# Patient Record
Sex: MALE | Race: WHITE | NOT HISPANIC OR LATINO | URBAN - METROPOLITAN AREA
[De-identification: names, ages, dates, MRNs, and addresses within clinical notes are randomized per-mention and may not be internally consistent; named-entity substitution may affect disease eponyms.]

---

## 2020-04-03 ENCOUNTER — INPATIENT (INPATIENT)
Facility: HOSPITAL | Age: 65
LOS: 6 days | Discharge: NURSING, REHAB OR LONG-TERM CARE | End: 2020-04-10
Attending: HOSPITALIST | Admitting: HOSPITALIST
Payer: SELF-PAY

## 2020-04-03 VITALS
TEMPERATURE: 98 F | SYSTOLIC BLOOD PRESSURE: 147 MMHG | DIASTOLIC BLOOD PRESSURE: 94 MMHG | RESPIRATION RATE: 18 BRPM | OXYGEN SATURATION: 97 % | HEART RATE: 100 BPM

## 2020-04-03 LAB
ALBUMIN SERPL ELPH-MCNC: 4 G/DL — SIGNIFICANT CHANGE UP (ref 3.3–5)
ALP SERPL-CCNC: 82 U/L — SIGNIFICANT CHANGE UP (ref 40–120)
ALT FLD-CCNC: 95 U/L — HIGH (ref 4–41)
AMMONIA BLD-MCNC: 16 UMOL/L — SIGNIFICANT CHANGE UP (ref 11–55)
ANION GAP SERPL CALC-SCNC: 19 MMO/L — HIGH (ref 7–14)
APAP SERPL-MCNC: < 15 UG/ML — LOW (ref 15–25)
APTT BLD: 26.3 SEC — LOW (ref 27.5–36.3)
AST SERPL-CCNC: 170 U/L — HIGH (ref 4–40)
BASE EXCESS BLDV CALC-SCNC: 0.3 MMOL/L — SIGNIFICANT CHANGE UP
BASOPHILS # BLD AUTO: 0.01 K/UL — SIGNIFICANT CHANGE UP (ref 0–0.2)
BASOPHILS NFR BLD AUTO: 0.1 % — SIGNIFICANT CHANGE UP (ref 0–2)
BILIRUB SERPL-MCNC: 0.7 MG/DL — SIGNIFICANT CHANGE UP (ref 0.2–1.2)
BLOOD GAS VENOUS - CREATININE: 1.59 MG/DL — HIGH (ref 0.5–1.3)
BLOOD GAS VENOUS - FIO2: 21 — SIGNIFICANT CHANGE UP
BUN SERPL-MCNC: 65 MG/DL — HIGH (ref 7–23)
CALCIUM SERPL-MCNC: 9.7 MG/DL — SIGNIFICANT CHANGE UP (ref 8.4–10.5)
CHLORIDE BLDV-SCNC: 109 MMOL/L — HIGH (ref 96–108)
CHLORIDE SERPL-SCNC: 99 MMOL/L — SIGNIFICANT CHANGE UP (ref 98–107)
CO2 SERPL-SCNC: 20 MMOL/L — LOW (ref 22–31)
CREAT SERPL-MCNC: 1.5 MG/DL — HIGH (ref 0.5–1.3)
EOSINOPHIL # BLD AUTO: 0.01 K/UL — SIGNIFICANT CHANGE UP (ref 0–0.5)
EOSINOPHIL NFR BLD AUTO: 0.1 % — SIGNIFICANT CHANGE UP (ref 0–6)
ETHANOL BLD-MCNC: < 10 MG/DL — SIGNIFICANT CHANGE UP
GAS PNL BLDV: 142 MMOL/L — SIGNIFICANT CHANGE UP (ref 136–146)
GLUCOSE BLDV-MCNC: 109 MG/DL — HIGH (ref 70–99)
GLUCOSE SERPL-MCNC: 111 MG/DL — HIGH (ref 70–99)
HCO3 BLDV-SCNC: 23 MMOL/L — SIGNIFICANT CHANGE UP (ref 20–27)
HCT VFR BLD CALC: 34 % — LOW (ref 39–50)
HCT VFR BLDV CALC: 35.8 % — LOW (ref 39–51)
HGB BLD-MCNC: 11.2 G/DL — LOW (ref 13–17)
HGB BLDV-MCNC: 11.6 G/DL — LOW (ref 13–17)
IMM GRANULOCYTES NFR BLD AUTO: 0.6 % — SIGNIFICANT CHANGE UP (ref 0–1.5)
INR BLD: 1.09 — SIGNIFICANT CHANGE UP (ref 0.88–1.17)
LACTATE BLDV-MCNC: 1.6 MMOL/L — SIGNIFICANT CHANGE UP (ref 0.5–2)
LYMPHOCYTES # BLD AUTO: 0.61 K/UL — LOW (ref 1–3.3)
LYMPHOCYTES # BLD AUTO: 6 % — LOW (ref 13–44)
MAGNESIUM SERPL-MCNC: 2.9 MG/DL — HIGH (ref 1.6–2.6)
MCHC RBC-ENTMCNC: 29.6 PG — SIGNIFICANT CHANGE UP (ref 27–34)
MCHC RBC-ENTMCNC: 32.9 % — SIGNIFICANT CHANGE UP (ref 32–36)
MCV RBC AUTO: 89.7 FL — SIGNIFICANT CHANGE UP (ref 80–100)
MONOCYTES # BLD AUTO: 1.12 K/UL — HIGH (ref 0–0.9)
MONOCYTES NFR BLD AUTO: 11 % — SIGNIFICANT CHANGE UP (ref 2–14)
NEUTROPHILS # BLD AUTO: 8.36 K/UL — HIGH (ref 1.8–7.4)
NEUTROPHILS NFR BLD AUTO: 82.2 % — HIGH (ref 43–77)
NRBC # FLD: 0 K/UL — SIGNIFICANT CHANGE UP (ref 0–0)
PCO2 BLDV: 43 MMHG — SIGNIFICANT CHANGE UP (ref 41–51)
PH BLDV: 7.38 PH — SIGNIFICANT CHANGE UP (ref 7.32–7.43)
PHOSPHATE SERPL-MCNC: 3.9 MG/DL — SIGNIFICANT CHANGE UP (ref 2.5–4.5)
PLATELET # BLD AUTO: 266 K/UL — SIGNIFICANT CHANGE UP (ref 150–400)
PMV BLD: 10.5 FL — SIGNIFICANT CHANGE UP (ref 7–13)
PO2 BLDV: < 24 MMHG — LOW (ref 35–40)
POTASSIUM BLDV-SCNC: 4.4 MMOL/L — SIGNIFICANT CHANGE UP (ref 3.4–4.5)
POTASSIUM SERPL-MCNC: 4.4 MMOL/L — SIGNIFICANT CHANGE UP (ref 3.5–5.3)
POTASSIUM SERPL-SCNC: 4.4 MMOL/L — SIGNIFICANT CHANGE UP (ref 3.5–5.3)
PROT SERPL-MCNC: 8.3 G/DL — SIGNIFICANT CHANGE UP (ref 6–8.3)
PROTHROM AB SERPL-ACNC: 12.6 SEC — SIGNIFICANT CHANGE UP (ref 9.8–13.1)
RBC # BLD: 3.79 M/UL — LOW (ref 4.2–5.8)
RBC # FLD: 13.8 % — SIGNIFICANT CHANGE UP (ref 10.3–14.5)
SALICYLATES SERPL-MCNC: < 5 MG/DL — LOW (ref 15–30)
SAO2 % BLDV: 23 % — LOW (ref 60–85)
SODIUM SERPL-SCNC: 138 MMOL/L — SIGNIFICANT CHANGE UP (ref 135–145)
WBC # BLD: 10.17 K/UL — SIGNIFICANT CHANGE UP (ref 3.8–10.5)
WBC # FLD AUTO: 10.17 K/UL — SIGNIFICANT CHANGE UP (ref 3.8–10.5)

## 2020-04-03 PROCEDURE — 70450 CT HEAD/BRAIN W/O DYE: CPT | Mod: 26

## 2020-04-03 PROCEDURE — 71045 X-RAY EXAM CHEST 1 VIEW: CPT | Mod: 26

## 2020-04-03 RX ORDER — CEFTRIAXONE 500 MG/1
1000 INJECTION, POWDER, FOR SOLUTION INTRAMUSCULAR; INTRAVENOUS ONCE
Refills: 0 | Status: COMPLETED | OUTPATIENT
Start: 2020-04-03 | End: 2020-04-03

## 2020-04-03 RX ORDER — AZITHROMYCIN 500 MG/1
500 TABLET, FILM COATED ORAL ONCE
Refills: 0 | Status: COMPLETED | OUTPATIENT
Start: 2020-04-03 | End: 2020-04-03

## 2020-04-03 RX ORDER — ACETAMINOPHEN 500 MG
650 TABLET ORAL ONCE
Refills: 0 | Status: COMPLETED | OUTPATIENT
Start: 2020-04-03 | End: 2020-04-03

## 2020-04-03 RX ORDER — SODIUM CHLORIDE 9 MG/ML
500 INJECTION INTRAMUSCULAR; INTRAVENOUS; SUBCUTANEOUS ONCE
Refills: 0 | Status: COMPLETED | OUTPATIENT
Start: 2020-04-03 | End: 2020-04-03

## 2020-04-03 RX ADMIN — SODIUM CHLORIDE 500 MILLILITER(S): 9 INJECTION INTRAMUSCULAR; INTRAVENOUS; SUBCUTANEOUS at 22:54

## 2020-04-03 RX ADMIN — CEFTRIAXONE 100 MILLIGRAM(S): 500 INJECTION, POWDER, FOR SOLUTION INTRAMUSCULAR; INTRAVENOUS at 22:54

## 2020-04-03 NOTE — ED PROVIDER NOTE - ATTENDING CONTRIBUTION TO CARE
DR. WHITE, ATTENDING MD-  I performed a face to face bedside interview with the patient regarding history of present illness, review of symptoms and past medical history. I completed an independent physical exam.  I have discussed the patient's plan of care with the resident.   Documentation as above in the note.    70 y/o male bib ems after found on ground in a bank lobby.  Noted to be confused.  Pt febrile here in ED.  Infectious or metabolic encephalopathy vs lyte abn vs ich.  Unclear baseline ms.  Obtain cbc, cmp, ct head, cxr, covid19, give antipyretic, admit for further care and evaluation.

## 2020-04-03 NOTE — ED PROVIDER NOTE - PHYSICAL EXAMINATION
Gen: AAOx1, only says yes, appears in NAD  Head: NCAT  ENT: Airway patent, dry mucous membranes, nasal passageways clear, b/l PERRLA   Cardiac: Normal rate, normal rhythm, no murmurs   Respiratory: Lungs CTA B/L  Gastrointestinal:  , Abdomen soft, nontender, nondistended, no rebound, no guarding   MSK: No gross abnormalities, FROM of all four extremities   HEME: Extremities warm, pulses intact and symmetrical in all four extremities  Skin: + scattered healing scratch marks over arms and legs   Neuro: No gross neurologic deficits, no facial asymmetry, slight intention tremor, unable to comply with exam but sits forward to use bathroom.

## 2020-04-03 NOTE — ED PROVIDER NOTE - OBJECTIVE STATEMENT
69M unclear history, as ot unable to offer any information, found by EMS seated on the ground inside of [place], unclear what had transpired prior, he was not aggressive but had an unsteady gait and reportedly did not appear intoxicated. Otherwise no information prior to being found.

## 2020-04-03 NOTE — ED ADULT TRIAGE NOTE - CHIEF COMPLAINT QUOTE
Pt brought in by EMS from [facility] for AMS, pt appears lethargic. Pt was found sitting in the bank.

## 2020-04-03 NOTE — ED PROVIDER NOTE - CLINICAL SUMMARY MEDICAL DECISION MAKING FREE TEXT BOX
69M found outside, unclear historian, no gross deficits, does not appear intoxicated, suspect dementia? will check labs, ekg, cxr, cth and reassess

## 2020-04-03 NOTE — ED PROVIDER NOTE - PROGRESS NOTE DETAILS
Elizabeth Goldberger PGY-3: pt signed out to me pending results. CTH w/o acute pathology. CXR w/ possible pna. Labs unremarkable other than possible VEGA 1.5 though no prior value. Will admit for pna, AMS and covid r/o

## 2020-04-04 DIAGNOSIS — R41.82 ALTERED MENTAL STATUS, UNSPECIFIED: ICD-10-CM

## 2020-04-04 DIAGNOSIS — Z29.9 ENCOUNTER FOR PROPHYLACTIC MEASURES, UNSPECIFIED: ICD-10-CM

## 2020-04-04 DIAGNOSIS — R74.0 NONSPECIFIC ELEVATION OF LEVELS OF TRANSAMINASE AND LACTIC ACID DEHYDROGENASE [LDH]: ICD-10-CM

## 2020-04-04 DIAGNOSIS — E87.2 ACIDOSIS: ICD-10-CM

## 2020-04-04 DIAGNOSIS — G93.40 ENCEPHALOPATHY, UNSPECIFIED: ICD-10-CM

## 2020-04-04 DIAGNOSIS — R50.9 FEVER, UNSPECIFIED: ICD-10-CM

## 2020-04-04 DIAGNOSIS — N17.9 ACUTE KIDNEY FAILURE, UNSPECIFIED: ICD-10-CM

## 2020-04-04 DIAGNOSIS — R68.89 OTHER GENERAL SYMPTOMS AND SIGNS: ICD-10-CM

## 2020-04-04 DIAGNOSIS — Z78.9 OTHER SPECIFIED HEALTH STATUS: Chronic | ICD-10-CM

## 2020-04-04 DIAGNOSIS — D64.9 ANEMIA, UNSPECIFIED: ICD-10-CM

## 2020-04-04 LAB
AMPHET UR-MCNC: NEGATIVE — SIGNIFICANT CHANGE UP
APPEARANCE UR: CLEAR — SIGNIFICANT CHANGE UP
BACTERIA # UR AUTO: NEGATIVE — SIGNIFICANT CHANGE UP
BARBITURATES UR SCN-MCNC: NEGATIVE — SIGNIFICANT CHANGE UP
BENZODIAZ UR-MCNC: NEGATIVE — SIGNIFICANT CHANGE UP
BILIRUB UR-MCNC: NEGATIVE — SIGNIFICANT CHANGE UP
BLOOD UR QL VISUAL: HIGH
CANNABINOIDS UR-MCNC: NEGATIVE — SIGNIFICANT CHANGE UP
COCAINE METAB.OTHER UR-MCNC: NEGATIVE — SIGNIFICANT CHANGE UP
COLOR SPEC: SIGNIFICANT CHANGE UP
GLUCOSE UR-MCNC: NEGATIVE — SIGNIFICANT CHANGE UP
HYALINE CASTS # UR AUTO: NEGATIVE — SIGNIFICANT CHANGE UP
KETONES UR-MCNC: SIGNIFICANT CHANGE UP
LEUKOCYTE ESTERASE UR-ACNC: NEGATIVE — SIGNIFICANT CHANGE UP
METHADONE UR-MCNC: NEGATIVE — SIGNIFICANT CHANGE UP
NITRITE UR-MCNC: NEGATIVE — SIGNIFICANT CHANGE UP
OPIATES UR-MCNC: NEGATIVE — SIGNIFICANT CHANGE UP
OXYCODONE UR-MCNC: NEGATIVE — SIGNIFICANT CHANGE UP
PCP UR-MCNC: NEGATIVE — SIGNIFICANT CHANGE UP
PH UR: 5.5 — SIGNIFICANT CHANGE UP (ref 5–8)
PROT UR-MCNC: 30 — SIGNIFICANT CHANGE UP
RBC CASTS # UR COMP ASSIST: HIGH (ref 0–?)
SARS-COV-2 RNA SPEC QL NAA+PROBE: DETECTED
SP GR SPEC: 1.02 — SIGNIFICANT CHANGE UP (ref 1–1.04)
SQUAMOUS # UR AUTO: SIGNIFICANT CHANGE UP
UROBILINOGEN FLD QL: NORMAL — SIGNIFICANT CHANGE UP
WBC UR QL: SIGNIFICANT CHANGE UP (ref 0–?)

## 2020-04-04 RX ORDER — SODIUM CHLORIDE 9 MG/ML
1000 INJECTION, SOLUTION INTRAVENOUS
Refills: 0 | Status: COMPLETED | OUTPATIENT
Start: 2020-04-04 | End: 2020-04-04

## 2020-04-04 RX ADMIN — SODIUM CHLORIDE 75 MILLILITER(S): 9 INJECTION, SOLUTION INTRAVENOUS at 08:08

## 2020-04-04 NOTE — H&P ADULT - HISTORY OF PRESENT ILLNESS
69 year old man found at [facility] [employer]. 69 year old man found at [facility]. Per the ED note, the patient was sitting on the ground, not aggressive, and had an unsteady gait. The patient is not able to provide any additional history.     In the ED: Tmax 100.5, , /94, R 18, SpO2 97%RA. He was given ceftriaxone, azithromycin, Tylenol, and 500cc of NS.

## 2020-04-04 NOTE — H&P ADULT - PROBLEM SELECTOR PLAN 8
F: No IVF for now  E: Replete electrolytes for a K of 4 and Mg of 2  N: NPO for now  P: SCDs  C: Full Code  D: Admit to medicine

## 2020-04-04 NOTE — H&P ADULT - NSHPLABSRESULTS_GEN_ALL_CORE
.  LABS:                         11.2   10.17 )-----------( 266      ( 03 Apr 2020 22:20 )             34.0     04-03    138  |  99  |  65<H>  ----------------------------<  111<H>  4.4   |  20<L>  |  1.50<H>    Ca    9.7      03 Apr 2020 22:20  Phos  3.9     04-03  Mg     2.9     04-03    TPro  8.3  /  Alb  4.0  /  TBili  0.7  /  DBili  x   /  AST  170<H>  /  ALT  95<H>  /  AlkPhos  82  04-03    PT/INR - ( 03 Apr 2020 22:20 )   PT: 12.6 SEC;   INR: 1.09          PTT - ( 03 Apr 2020 22:20 )  PTT:26.3 SEC              RADIOLOGY, EKG & ADDITIONAL TESTS: Reviewed. LABS:                         11.2   10.17 )-----------( 266      ( 03 Apr 2020 22:20 )             34.0     04-03    138  |  99  |  65<H>  ----------------------------<  111<H>  4.4   |  20<L>  |  1.50<H>    Ca    9.7      03 Apr 2020 22:20  Phos  3.9     04-03  Mg     2.9     04-03    TPro  8.3  /  Alb  4.0  /  TBili  0.7  /  DBili  x   /  AST  170<H>  /  ALT  95<H>  /  AlkPhos  82  04-03    PT/INR - ( 03 Apr 2020 22:20 )   PT: 12.6 SEC;   INR: 1.09          PTT - ( 03 Apr 2020 22:20 )  PTT:26.3 SEC    Toxicology Screen, Drugs of Abuse, Urine (04.04.20 @ 00:15)    Phencyclidine Level, Urine: NEGATIVE    Amphetamine, Urine: NEGATIVE    Barbiturates Screen, Urine: NEGATIVE    Benzodiazepine, Urine: NEGATIVE    Cannabinoids, Urine: NEGATIVE    Cocaine Metabolite, Urine: NEGATIVE    Methadone, Urine: NEGATIVE    Opiate, Urine: NEGATIVE    Oxycodone, Urine: NEGATIVE    Toxicology Screen, Drugs of Abuse, Serum (04.03.20 @ 22:20)    Ethanol, Whole Blood: < 10:     Acetaminophen Level, Serum: < 15.0    Salicylate Level, Serum: < 5.0    RADIOLOGY, EKG & ADDITIONAL TESTS:     < from: Xray Chest 1 View AP/PA (04.03.20 @ 21:31) >    INTERPRETATION:  Right lowerlung linear opacity likely atelectasis. Infection cannot be completely excluded.    < end of copied text >    < from: CT Head No Cont (04.03.20 @ 21:05) >    IMPRESSION:     No acute intracranial hemorrhage or mass effect, given the extent of artifact. If clinically indicated, short-term follow-up or MRI may be obtained for further evaluation.     < end of copied text >

## 2020-04-04 NOTE — H&P ADULT - ASSESSMENT
69 year old man found down at [employer]. Febrile, altered mental status. Possible COVID, no other obvious infectious causes. Also with an anion gap metabolic acidosis.

## 2020-04-04 NOTE — PROGRESS NOTE ADULT - PROBLEM SELECTOR PLAN 1
Unclear etiology.   May be secondary to COVID-19  No other clear source  CT head unremarkable  Tox screen negative  labs do not explain MS

## 2020-04-04 NOTE — PROGRESS NOTE ADULT - PROBLEM SELECTOR PLAN 2
COVID +  Minimal O2 requirements at the present  Unclear if there is a benefit for encephalopathy in these patients - given aspiration risk, will hold off plaquenil at present

## 2020-04-04 NOTE — H&P ADULT - PROBLEM SELECTOR PLAN 3
Unclear etiology. Lactate is normal. Possibly 2/2 BUN. Doubt starvation ketoacidosis since only small ketones on Ua. Salicylates and ethanol negative.   - Check serum osm  - Trend BMP

## 2020-04-04 NOTE — H&P ADULT - PROBLEM SELECTOR PLAN 1
Unclear etiology. Possible causes in this patient include: COVID, uremia, medication or drug ingestion, drug or alcohol withdrawal, dementia. No major electrolyte derangements. Ammonia in normal range.  Glucose normal. CT head with no acute findings.   - Check serum osm, B12, Folate, TSH, syphilis screen   - Follow up blood cultures  - Follow up COVID swab   - Social work consult  - HIV screen when patient is able to consent  - Will give banana bag x1

## 2020-04-04 NOTE — H&P ADULT - PROBLEM SELECTOR PLAN 4
Possible COVID. No other obvious infectious sources at this time.   - Monitor off antibiotics for now  - Follow up blood cultures, COVID swab

## 2020-04-04 NOTE — PROGRESS NOTE ADULT - ASSESSMENT
69 year old man found down at [place]. Febrile, altered mental status. Possible COVID, no other obvious infectious causes. Also with an anion gap metabolic acidosis.

## 2020-04-04 NOTE — H&P ADULT - NSHPPHYSICALEXAM_GEN_ALL_CORE
VITAL SIGNS:  T(C): 38.1 (04-03-20 @ 21:47), Max: 38.1 (04-03-20 @ 21:47)  T(F): 100.5 (04-03-20 @ 21:47), Max: 100.5 (04-03-20 @ 21:47)  HR: 100 (04-03-20 @ 18:14) (100 - 100)  BP: 147/94 (04-03-20 @ 18:14) (147/94 - 147/94)  BP(mean): --  RR: 18 (04-03-20 @ 18:14) (18 - 18)  SpO2: 97% (04-03-20 @ 18:14) (97% - 97%)  Wt(kg): --    PHYSICAL EXAM:    Constitutional: Unkempt elderly man sleeping comfortably on stretcher   Head: NC/AT, temporal wasting   Eyes: clear conjunctiva  ENT: MMM, poor dental hygiene   Neck: supple; no JVD, seborrheic dermatitis on face and ears  Respiratory: CTA B/L;   Cardiac: +S1/S2; RRR; no M/R/G  Gastrointestinal: soft, NT/ND; no rebound or guarding;   Genitourinary: no bailon  Extremities: WWP, no clubbing or cyanosis; no peripheral edema  Musculoskeletal: NROM x4; no joint swelling, tenderness or erythema  Dermatologic: excoriations all over extremities, bilateral distal extremities with mild blanching erythema, no induration  Lymphatic: no submandibular or cervical LAD  Neurologic: awakens to noxious stimuli, does not answer questions appropriately   Psychiatric: unable to assess

## 2020-04-05 DIAGNOSIS — E86.0 DEHYDRATION: ICD-10-CM

## 2020-04-05 DIAGNOSIS — U07.1 COVID-19: ICD-10-CM

## 2020-04-05 LAB
ALBUMIN SERPL ELPH-MCNC: 3.4 G/DL — SIGNIFICANT CHANGE UP (ref 3.3–5)
ALP SERPL-CCNC: 73 U/L — SIGNIFICANT CHANGE UP (ref 40–120)
ALT FLD-CCNC: 80 U/L — HIGH (ref 4–41)
ANION GAP SERPL CALC-SCNC: 18 MMO/L — HIGH (ref 7–14)
AST SERPL-CCNC: 98 U/L — HIGH (ref 4–40)
BASE EXCESS BLDV CALC-SCNC: 0.6 MMOL/L — SIGNIFICANT CHANGE UP
BASOPHILS # BLD AUTO: 0.01 K/UL — SIGNIFICANT CHANGE UP (ref 0–0.2)
BASOPHILS NFR BLD AUTO: 0.1 % — SIGNIFICANT CHANGE UP (ref 0–2)
BILIRUB SERPL-MCNC: 0.6 MG/DL — SIGNIFICANT CHANGE UP (ref 0.2–1.2)
BLOOD GAS VENOUS - CREATININE: 0.99 MG/DL — SIGNIFICANT CHANGE UP (ref 0.5–1.3)
BLOOD GAS VENOUS - FIO2: 21 — SIGNIFICANT CHANGE UP
BUN SERPL-MCNC: 29 MG/DL — HIGH (ref 7–23)
CALCIUM SERPL-MCNC: 9.4 MG/DL — SIGNIFICANT CHANGE UP (ref 8.4–10.5)
CHLORIDE BLDV-SCNC: 115 MMOL/L — HIGH (ref 96–108)
CHLORIDE SERPL-SCNC: 110 MMOL/L — HIGH (ref 98–107)
CO2 SERPL-SCNC: 20 MMOL/L — LOW (ref 22–31)
CREAT SERPL-MCNC: 1.03 MG/DL — SIGNIFICANT CHANGE UP (ref 0.5–1.3)
CRP SERPL HS-MCNC: 214.63 MG/L — SIGNIFICANT CHANGE UP
EOSINOPHIL # BLD AUTO: 0.02 K/UL — SIGNIFICANT CHANGE UP (ref 0–0.5)
EOSINOPHIL NFR BLD AUTO: 0.3 % — SIGNIFICANT CHANGE UP (ref 0–6)
FERRITIN SERPL-MCNC: 386.6 NG/ML — SIGNIFICANT CHANGE UP (ref 30–400)
FOLATE SERPL-MCNC: 13 NG/ML — SIGNIFICANT CHANGE UP (ref 4.7–20)
GAS PNL BLDV: 147 MMOL/L — HIGH (ref 136–146)
GLUCOSE BLDV-MCNC: 108 MG/DL — HIGH (ref 70–99)
GLUCOSE SERPL-MCNC: 105 MG/DL — HIGH (ref 70–99)
HCO3 BLDV-SCNC: 25 MMOL/L — SIGNIFICANT CHANGE UP (ref 20–27)
HCT VFR BLD CALC: 30.3 % — LOW (ref 39–50)
HCT VFR BLDV CALC: 32.2 % — LOW (ref 39–51)
HGB BLD-MCNC: 10.2 G/DL — LOW (ref 13–17)
HGB BLDV-MCNC: 10.4 G/DL — LOW (ref 13–17)
IMM GRANULOCYTES NFR BLD AUTO: 0.5 % — SIGNIFICANT CHANGE UP (ref 0–1.5)
IRON SATN MFR SERPL: 178 UG/DL — SIGNIFICANT CHANGE UP (ref 155–535)
IRON SATN MFR SERPL: 19 UG/DL — LOW (ref 45–165)
LACTATE BLDV-MCNC: 2.5 MMOL/L — HIGH (ref 0.5–2)
LYMPHOCYTES # BLD AUTO: 0.43 K/UL — LOW (ref 1–3.3)
LYMPHOCYTES # BLD AUTO: 5.9 % — LOW (ref 13–44)
MAGNESIUM SERPL-MCNC: 2.4 MG/DL — SIGNIFICANT CHANGE UP (ref 1.6–2.6)
MCHC RBC-ENTMCNC: 31.1 PG — SIGNIFICANT CHANGE UP (ref 27–34)
MCHC RBC-ENTMCNC: 33.7 % — SIGNIFICANT CHANGE UP (ref 32–36)
MCV RBC AUTO: 92.4 FL — SIGNIFICANT CHANGE UP (ref 80–100)
MONOCYTES # BLD AUTO: 0.87 K/UL — SIGNIFICANT CHANGE UP (ref 0–0.9)
MONOCYTES NFR BLD AUTO: 11.9 % — SIGNIFICANT CHANGE UP (ref 2–14)
NEUTROPHILS # BLD AUTO: 5.92 K/UL — SIGNIFICANT CHANGE UP (ref 1.8–7.4)
NEUTROPHILS NFR BLD AUTO: 81.3 % — HIGH (ref 43–77)
NRBC # FLD: 0 K/UL — SIGNIFICANT CHANGE UP (ref 0–0)
OSMOLALITY SERPL: 311 MOSMO/KG — HIGH (ref 275–295)
PCO2 BLDV: 43 MMHG — SIGNIFICANT CHANGE UP (ref 41–51)
PH BLDV: 7.39 PH — SIGNIFICANT CHANGE UP (ref 7.32–7.43)
PHOSPHATE SERPL-MCNC: 2.9 MG/DL — SIGNIFICANT CHANGE UP (ref 2.5–4.5)
PLATELET # BLD AUTO: 242 K/UL — SIGNIFICANT CHANGE UP (ref 150–400)
PMV BLD: 11.1 FL — SIGNIFICANT CHANGE UP (ref 7–13)
PO2 BLDV: 48 MMHG — HIGH (ref 35–40)
POTASSIUM BLDV-SCNC: 4 MMOL/L — SIGNIFICANT CHANGE UP (ref 3.4–4.5)
POTASSIUM SERPL-MCNC: 4.3 MMOL/L — SIGNIFICANT CHANGE UP (ref 3.5–5.3)
POTASSIUM SERPL-SCNC: 4.3 MMOL/L — SIGNIFICANT CHANGE UP (ref 3.5–5.3)
PROT SERPL-MCNC: 7.4 G/DL — SIGNIFICANT CHANGE UP (ref 6–8.3)
RBC # BLD: 3.28 M/UL — LOW (ref 4.2–5.8)
RBC # FLD: 14.2 % — SIGNIFICANT CHANGE UP (ref 10.3–14.5)
SAO2 % BLDV: 76.5 % — SIGNIFICANT CHANGE UP (ref 60–85)
SODIUM SERPL-SCNC: 148 MMOL/L — HIGH (ref 135–145)
TSH SERPL-MCNC: 0.19 UIU/ML — LOW (ref 0.27–4.2)
UIBC SERPL-MCNC: 158.9 UG/DL — SIGNIFICANT CHANGE UP (ref 110–370)
VIT B12 SERPL-MCNC: 638 PG/ML — SIGNIFICANT CHANGE UP (ref 200–900)
WBC # BLD: 7.29 K/UL — SIGNIFICANT CHANGE UP (ref 3.8–10.5)
WBC # FLD AUTO: 7.29 K/UL — SIGNIFICANT CHANGE UP (ref 3.8–10.5)

## 2020-04-05 PROCEDURE — 99233 SBSQ HOSP IP/OBS HIGH 50: CPT

## 2020-04-05 RX ORDER — THIAMINE MONONITRATE (VIT B1) 100 MG
100 TABLET ORAL DAILY
Refills: 0 | Status: DISCONTINUED | OUTPATIENT
Start: 2020-04-05 | End: 2020-04-06

## 2020-04-05 RX ORDER — SODIUM CHLORIDE 9 MG/ML
450 INJECTION, SOLUTION INTRAVENOUS
Refills: 0 | Status: DISCONTINUED | OUTPATIENT
Start: 2020-04-05 | End: 2020-04-06

## 2020-04-05 RX ORDER — HYDROXYCHLOROQUINE SULFATE 200 MG
400 TABLET ORAL EVERY 12 HOURS
Refills: 0 | Status: COMPLETED | OUTPATIENT
Start: 2020-04-05 | End: 2020-04-06

## 2020-04-05 RX ORDER — ENOXAPARIN SODIUM 100 MG/ML
40 INJECTION SUBCUTANEOUS DAILY
Refills: 0 | Status: DISCONTINUED | OUTPATIENT
Start: 2020-04-05 | End: 2020-04-10

## 2020-04-05 RX ORDER — HYDROXYCHLOROQUINE SULFATE 200 MG
200 TABLET ORAL EVERY 12 HOURS
Refills: 0 | Status: DISCONTINUED | OUTPATIENT
Start: 2020-04-06 | End: 2020-04-06

## 2020-04-05 RX ORDER — ASCORBIC ACID 60 MG
500 TABLET,CHEWABLE ORAL
Refills: 0 | Status: DISCONTINUED | OUTPATIENT
Start: 2020-04-05 | End: 2020-04-06

## 2020-04-05 RX ORDER — SODIUM CHLORIDE 9 MG/ML
1000 INJECTION INTRAMUSCULAR; INTRAVENOUS; SUBCUTANEOUS
Refills: 0 | Status: DISCONTINUED | OUTPATIENT
Start: 2020-04-05 | End: 2020-04-05

## 2020-04-05 RX ORDER — HYDROXYCHLOROQUINE SULFATE 200 MG
TABLET ORAL
Refills: 0 | Status: DISCONTINUED | OUTPATIENT
Start: 2020-04-05 | End: 2020-04-06

## 2020-04-05 RX ADMIN — ENOXAPARIN SODIUM 40 MILLIGRAM(S): 100 INJECTION SUBCUTANEOUS at 16:05

## 2020-04-05 RX ADMIN — Medication 400 MILLIGRAM(S): at 16:05

## 2020-04-05 RX ADMIN — SODIUM CHLORIDE 75 MILLILITER(S): 9 INJECTION, SOLUTION INTRAVENOUS at 16:00

## 2020-04-05 RX ADMIN — Medication 100 MILLIGRAM(S): at 16:05

## 2020-04-05 RX ADMIN — Medication 500 MILLIGRAM(S): at 16:05

## 2020-04-05 RX ADMIN — SODIUM CHLORIDE 75 MILLILITER(S): 9 INJECTION INTRAMUSCULAR; INTRAVENOUS; SUBCUTANEOUS at 07:58

## 2020-04-05 NOTE — PROGRESS NOTE ADULT - ATTENDING COMMENTS
Amador Mann MD.    Attending Physician, [facility].   x [phone]
Amador Mann MD.    Attending Physician, [facility].   x [phone]    CPT [id number]  Diagnoses:  Encephalopathy  COVID-19  VEGA  Transaminitis

## 2020-04-05 NOTE — PROGRESS NOTE ADULT - PROBLEM SELECTOR PLAN 2
Given encephalopathy, will start plaquenil  Trend inflammatory markers - if no improvement in mental status and inflammatory markers are increasing, would consider starting decadron (better CNS penetration than Solumedrol) 4mg q 6 for now.

## 2020-04-05 NOTE — PROGRESS NOTE ADULT - PROBLEM SELECTOR PLAN 1
Suspecting secondary to COVID-19  No other clear source  CT head unremarkable  Tox screen negative  Labs do not explain MS  Care as below

## 2020-04-06 DIAGNOSIS — E07.9 DISORDER OF THYROID, UNSPECIFIED: ICD-10-CM

## 2020-04-06 LAB
ALBUMIN SERPL ELPH-MCNC: 3.1 G/DL — LOW (ref 3.3–5)
ALP SERPL-CCNC: 64 U/L — SIGNIFICANT CHANGE UP (ref 40–120)
ALT FLD-CCNC: 66 U/L — HIGH (ref 4–41)
ANION GAP SERPL CALC-SCNC: 13 MMO/L — SIGNIFICANT CHANGE UP (ref 7–14)
AST SERPL-CCNC: 70 U/L — HIGH (ref 4–40)
BASE EXCESS BLDV CALC-SCNC: 0.1 MMOL/L — SIGNIFICANT CHANGE UP
BASOPHILS # BLD AUTO: 0.01 K/UL — SIGNIFICANT CHANGE UP (ref 0–0.2)
BASOPHILS NFR BLD AUTO: 0.2 % — SIGNIFICANT CHANGE UP (ref 0–2)
BILIRUB SERPL-MCNC: 0.6 MG/DL — SIGNIFICANT CHANGE UP (ref 0.2–1.2)
BLOOD GAS VENOUS - CREATININE: 0.87 MG/DL — SIGNIFICANT CHANGE UP (ref 0.5–1.3)
BUN SERPL-MCNC: 22 MG/DL — SIGNIFICANT CHANGE UP (ref 7–23)
CALCIUM SERPL-MCNC: 9 MG/DL — SIGNIFICANT CHANGE UP (ref 8.4–10.5)
CHLORIDE BLDV-SCNC: 111 MMOL/L — HIGH (ref 96–108)
CHLORIDE SERPL-SCNC: 106 MMOL/L — SIGNIFICANT CHANGE UP (ref 98–107)
CO2 SERPL-SCNC: 22 MMOL/L — SIGNIFICANT CHANGE UP (ref 22–31)
CREAT SERPL-MCNC: 0.87 MG/DL — SIGNIFICANT CHANGE UP (ref 0.5–1.3)
CRP SERPL HS-MCNC: 142.31 MG/L — SIGNIFICANT CHANGE UP
EOSINOPHIL # BLD AUTO: 0.07 K/UL — SIGNIFICANT CHANGE UP (ref 0–0.5)
EOSINOPHIL NFR BLD AUTO: 1.2 % — SIGNIFICANT CHANGE UP (ref 0–6)
FERRITIN SERPL-MCNC: 398.3 NG/ML — SIGNIFICANT CHANGE UP (ref 30–400)
GAS PNL BLDV: 140 MMOL/L — SIGNIFICANT CHANGE UP (ref 136–146)
GLUCOSE BLDV-MCNC: 93 MG/DL — SIGNIFICANT CHANGE UP (ref 70–99)
GLUCOSE SERPL-MCNC: 96 MG/DL — SIGNIFICANT CHANGE UP (ref 70–99)
HCO3 BLDV-SCNC: 24 MMOL/L — SIGNIFICANT CHANGE UP (ref 20–27)
HCT VFR BLD CALC: 30.4 % — LOW (ref 39–50)
HCT VFR BLDV CALC: 30.1 % — LOW (ref 39–51)
HGB BLD-MCNC: 9.8 G/DL — LOW (ref 13–17)
HGB BLDV-MCNC: 9.7 G/DL — LOW (ref 13–17)
IMM GRANULOCYTES NFR BLD AUTO: 0.3 % — SIGNIFICANT CHANGE UP (ref 0–1.5)
LACTATE BLDV-MCNC: 1.3 MMOL/L — SIGNIFICANT CHANGE UP (ref 0.5–2)
LYMPHOCYTES # BLD AUTO: 0.49 K/UL — LOW (ref 1–3.3)
LYMPHOCYTES # BLD AUTO: 8.5 % — LOW (ref 13–44)
MAGNESIUM SERPL-MCNC: 2 MG/DL — SIGNIFICANT CHANGE UP (ref 1.6–2.6)
MCHC RBC-ENTMCNC: 29.1 PG — SIGNIFICANT CHANGE UP (ref 27–34)
MCHC RBC-ENTMCNC: 32.2 % — SIGNIFICANT CHANGE UP (ref 32–36)
MCV RBC AUTO: 90.2 FL — SIGNIFICANT CHANGE UP (ref 80–100)
MONOCYTES # BLD AUTO: 0.52 K/UL — SIGNIFICANT CHANGE UP (ref 0–0.9)
MONOCYTES NFR BLD AUTO: 9 % — SIGNIFICANT CHANGE UP (ref 2–14)
NEUTROPHILS # BLD AUTO: 4.65 K/UL — SIGNIFICANT CHANGE UP (ref 1.8–7.4)
NEUTROPHILS NFR BLD AUTO: 80.8 % — HIGH (ref 43–77)
NRBC # FLD: 0 K/UL — SIGNIFICANT CHANGE UP (ref 0–0)
PCO2 BLDV: 37 MMHG — LOW (ref 41–51)
PH BLDV: 7.43 PH — SIGNIFICANT CHANGE UP (ref 7.32–7.43)
PHOSPHATE SERPL-MCNC: 3.3 MG/DL — SIGNIFICANT CHANGE UP (ref 2.5–4.5)
PLATELET # BLD AUTO: 207 K/UL — SIGNIFICANT CHANGE UP (ref 150–400)
PMV BLD: 10.5 FL — SIGNIFICANT CHANGE UP (ref 7–13)
PO2 BLDV: 49 MMHG — HIGH (ref 35–40)
POTASSIUM BLDV-SCNC: 3.9 MMOL/L — SIGNIFICANT CHANGE UP (ref 3.4–4.5)
POTASSIUM SERPL-MCNC: 3.9 MMOL/L — SIGNIFICANT CHANGE UP (ref 3.5–5.3)
POTASSIUM SERPL-SCNC: 3.9 MMOL/L — SIGNIFICANT CHANGE UP (ref 3.5–5.3)
PROT SERPL-MCNC: 6.9 G/DL — SIGNIFICANT CHANGE UP (ref 6–8.3)
RBC # BLD: 3.37 M/UL — LOW (ref 4.2–5.8)
RBC # FLD: 14 % — SIGNIFICANT CHANGE UP (ref 10.3–14.5)
SAO2 % BLDV: 80.5 % — SIGNIFICANT CHANGE UP (ref 60–85)
SODIUM SERPL-SCNC: 141 MMOL/L — SIGNIFICANT CHANGE UP (ref 135–145)
T PALLIDUM AB TITR SER: NEGATIVE — SIGNIFICANT CHANGE UP
T PALLIDUM AB TITR SER: NEGATIVE — SIGNIFICANT CHANGE UP
T3 SERPL-MCNC: 78 NG/DL — LOW (ref 80–200)
T4 AB SER-ACNC: 8.83 UG/DL — SIGNIFICANT CHANGE UP (ref 5.1–13)
WBC # BLD: 5.76 K/UL — SIGNIFICANT CHANGE UP (ref 3.8–10.5)
WBC # FLD AUTO: 5.76 K/UL — SIGNIFICANT CHANGE UP (ref 3.8–10.5)

## 2020-04-06 PROCEDURE — 99233 SBSQ HOSP IP/OBS HIGH 50: CPT

## 2020-04-06 PROCEDURE — 93010 ELECTROCARDIOGRAM REPORT: CPT

## 2020-04-06 RX ADMIN — ENOXAPARIN SODIUM 40 MILLIGRAM(S): 100 INJECTION SUBCUTANEOUS at 11:42

## 2020-04-06 RX ADMIN — Medication 500 MILLIGRAM(S): at 05:41

## 2020-04-06 RX ADMIN — Medication 400 MILLIGRAM(S): at 05:41

## 2020-04-06 RX ADMIN — Medication 100 MILLIGRAM(S): at 11:50

## 2020-04-06 NOTE — PROGRESS NOTE ADULT - PROBLEM SELECTOR PLAN 1
- Possibly secondary to COVID-19 as there is no other clear source.  High suspicion that there is an underlying psychiatric component to his change in mental status.   - CT head unremarkable  - Tox screen negative  - Labs do not explain MS change  - Syphilis screen negative

## 2020-04-06 NOTE — PROGRESS NOTE ADULT - PROBLEM SELECTOR PLAN 4
- TSH suppressed, free T4 pending (added on to labs)   - Unable to obtain history from pt to further elucidate hyperthyroid symptoms, but pt does not appear to be overtly in thyroid storm

## 2020-04-06 NOTE — PROGRESS NOTE ADULT - PROBLEM SELECTOR PLAN 2
- Would dc plaquenil at this time, pt not hypoxic and QTc on repeat EKG 480ms  - Pt so far is saturating well on RA, afebrile for past 24 hours  - Transaminitis likely reflective of acute COVID infection  - Would hold off on trending inflammatory markers as pt not hypoxic

## 2020-04-06 NOTE — PROGRESS NOTE ADULT - ASSESSMENT
69M found in [facility] [employer]. On admission, febrile with acute encephalopathy, found to be COVID positive with no other obvious infectious causes. Also with an anion gap metabolic acidosis.

## 2020-04-07 LAB
ALBUMIN SERPL ELPH-MCNC: 3 G/DL — LOW (ref 3.3–5)
ALP SERPL-CCNC: 63 U/L — SIGNIFICANT CHANGE UP (ref 40–120)
ALT FLD-CCNC: 60 U/L — HIGH (ref 4–41)
ANION GAP SERPL CALC-SCNC: 9 MMO/L — SIGNIFICANT CHANGE UP (ref 7–14)
AST SERPL-CCNC: 50 U/L — HIGH (ref 4–40)
BILIRUB SERPL-MCNC: 0.4 MG/DL — SIGNIFICANT CHANGE UP (ref 0.2–1.2)
BUN SERPL-MCNC: 19 MG/DL — SIGNIFICANT CHANGE UP (ref 7–23)
CALCIUM SERPL-MCNC: 8.5 MG/DL — SIGNIFICANT CHANGE UP (ref 8.4–10.5)
CHLORIDE SERPL-SCNC: 103 MMOL/L — SIGNIFICANT CHANGE UP (ref 98–107)
CO2 SERPL-SCNC: 25 MMOL/L — SIGNIFICANT CHANGE UP (ref 22–31)
CREAT SERPL-MCNC: 0.87 MG/DL — SIGNIFICANT CHANGE UP (ref 0.5–1.3)
GLUCOSE SERPL-MCNC: 96 MG/DL — SIGNIFICANT CHANGE UP (ref 70–99)
HCT VFR BLD CALC: 30.4 % — LOW (ref 39–50)
HGB BLD-MCNC: 9.9 G/DL — LOW (ref 13–17)
HIV 1+2 AB+HIV1 P24 AG SERPL QL IA: SIGNIFICANT CHANGE UP
MAGNESIUM SERPL-MCNC: 2 MG/DL — SIGNIFICANT CHANGE UP (ref 1.6–2.6)
MCHC RBC-ENTMCNC: 29.6 PG — SIGNIFICANT CHANGE UP (ref 27–34)
MCHC RBC-ENTMCNC: 32.6 % — SIGNIFICANT CHANGE UP (ref 32–36)
MCV RBC AUTO: 90.7 FL — SIGNIFICANT CHANGE UP (ref 80–100)
NRBC # FLD: 0 K/UL — SIGNIFICANT CHANGE UP (ref 0–0)
PHOSPHATE SERPL-MCNC: 3.8 MG/DL — SIGNIFICANT CHANGE UP (ref 2.5–4.5)
PLATELET # BLD AUTO: 230 K/UL — SIGNIFICANT CHANGE UP (ref 150–400)
PMV BLD: 10.5 FL — SIGNIFICANT CHANGE UP (ref 7–13)
POTASSIUM SERPL-MCNC: 4.3 MMOL/L — SIGNIFICANT CHANGE UP (ref 3.5–5.3)
POTASSIUM SERPL-SCNC: 4.3 MMOL/L — SIGNIFICANT CHANGE UP (ref 3.5–5.3)
PROT SERPL-MCNC: 6.2 G/DL — SIGNIFICANT CHANGE UP (ref 6–8.3)
RBC # BLD: 3.35 M/UL — LOW (ref 4.2–5.8)
RBC # FLD: 13.7 % — SIGNIFICANT CHANGE UP (ref 10.3–14.5)
SODIUM SERPL-SCNC: 137 MMOL/L — SIGNIFICANT CHANGE UP (ref 135–145)
T4 FREE SERPL-MCNC: 1.49 NG/DL — SIGNIFICANT CHANGE UP (ref 0.9–1.8)
WBC # BLD: 4.52 K/UL — SIGNIFICANT CHANGE UP (ref 3.8–10.5)
WBC # FLD AUTO: 4.52 K/UL — SIGNIFICANT CHANGE UP (ref 3.8–10.5)

## 2020-04-07 PROCEDURE — 99232 SBSQ HOSP IP/OBS MODERATE 35: CPT

## 2020-04-07 RX ADMIN — ENOXAPARIN SODIUM 40 MILLIGRAM(S): 100 INJECTION SUBCUTANEOUS at 12:06

## 2020-04-07 NOTE — PROGRESS NOTE ADULT - PROBLEM SELECTOR PLAN 6
VTE PPx with lovenox  Social work involved, [employer] director also informed VTE PPx with lovenox  Social work involved, SW director also informed ? living situation

## 2020-04-07 NOTE — PROGRESS NOTE ADULT - PROBLEM SELECTOR PLAN 1
Albina is calling for a refill on her ranolazine 1000mg.  You have not filled previously.  Was filled by Dr Bebeto Carbone MD.  Would like it faxed to [facility] in [place].  ID# [id number]  Fax #[phone].      Please review and fill if appropriate.      Ranolazine 1000mg      Last Written Prescription Date:  7/28/17  Last Fill Quantity: 180,   # refills: 3  Last Office Visit: 5/31/18  Future Office visit:       Routing refill request to provider for review/approval because:  Drug not on the [facility], [facility] or [facility] refill protocol or controlled substance     - Possibly secondary to COVID-19 as there is no other clear source.  High suspicion that there is an underlying psychiatric component to his change in mental status.   - CT head unremarkable  - Tox screen negative  - Labs do not explain MS change  - Syphilis screen negative

## 2020-04-07 NOTE — PROGRESS NOTE ADULT - ASSESSMENT
69M found in [facility] [place]. On admission, febrile with acute encephalopathy, found to be COVID positive with no other obvious infectious causes. Also with an anion gap metabolic acidosis. 69M found in [facility]. On admission, febrile with acute encephalopathy, found to be COVID positive with no other obvious infectious causes. unclear what his true identity is- will contact SW in am

## 2020-04-08 LAB
CULTURE RESULTS: SIGNIFICANT CHANGE UP
CULTURE RESULTS: SIGNIFICANT CHANGE UP
SPECIMEN SOURCE: SIGNIFICANT CHANGE UP
SPECIMEN SOURCE: SIGNIFICANT CHANGE UP

## 2020-04-08 PROCEDURE — 99232 SBSQ HOSP IP/OBS MODERATE 35: CPT

## 2020-04-08 RX ADMIN — ENOXAPARIN SODIUM 40 MILLIGRAM(S): 100 INJECTION SUBCUTANEOUS at 11:19

## 2020-04-08 NOTE — PROGRESS NOTE ADULT - PROBLEM SELECTOR PLAN 1
- Possibly secondary to COVID-19 as there is no other clear source.  High suspicion that there is an underlying psychiatric component to his change in mental status.   - CT head unremarkable  - Tox screen negative  - Labs do not explain MS change  - Syphilis screen negative - Possibly secondary to COVID-19 as there is no other clear source.  High suspicion that there is an underlying psychiatric component like dementia to his change in mental status. unable to obtain collateral at this time  - CT head unremarkable  - Tox screen negative  - Labs do not explain MS change  - Syphilis screen negative

## 2020-04-08 NOTE — PROGRESS NOTE ADULT - ASSESSMENT
69M found in lobby of [email or web address]. On admission, febrile with acute encephalopathy, found to be COVID positive with no other obvious infectious causes. unclear what his true identity is- SW dept filing missing person's report  will d/w derm 4/9 re: skin excoriations

## 2020-04-08 NOTE — PROGRESS NOTE ADULT - PROBLEM SELECTOR PLAN 4
- TSH suppressed, free T4 pending (added on to labs)   - Unable to obtain history from pt to further elucidate hyperthyroid symptoms, but pt does not appear to be overtly in thyroid storm - TSH suppressed, free T4 normal  monitor for now

## 2020-04-08 NOTE — PROGRESS NOTE ADULT - PROBLEM SELECTOR PLAN 2
- Would dc plaquenil at this time, pt not hypoxic and QTc on repeat EKG 480ms  - Pt so far is saturating well on RA, afebrile for past 24 hours  - Transaminitis likely reflective of acute COVID infection  - Would hold off on trending inflammatory markers as pt not hypoxic - Would dc plaquenil at this time, pt not hypoxic and QTc on repeat EKG 480ms  - Pt so far is saturating well on RA, afebrile for past 24 hours  - Transaminitis likely reflective of acute COVID infection  - Would hold off on trending inflammatory markers as pt not hypoxic  ideally can be discharged- unclear where to send him- social work trying to sort out his identity

## 2020-04-09 PROCEDURE — 99232 SBSQ HOSP IP/OBS MODERATE 35: CPT

## 2020-04-09 RX ADMIN — ENOXAPARIN SODIUM 40 MILLIGRAM(S): 100 INJECTION SUBCUTANEOUS at 14:32

## 2020-04-09 NOTE — PROGRESS NOTE ADULT - PROBLEM SELECTOR PROBLEM 3
Dehydration
VEGA (acute kidney injury)
VEGA (acute kidney injury)

## 2020-04-09 NOTE — PROGRESS NOTE ADULT - PROBLEM SELECTOR PLAN 5
- Likely 2/2 COVID
Likely 2/2 COVID  Trending CMP
PAS for VTE ppx for now   trial of diet when mental status improves

## 2020-04-09 NOTE — PROGRESS NOTE ADULT - PROBLEM SELECTOR PLAN 1
- Possibly secondary to COVID-19 as there is no other clear source.  High suspicion that there is an underlying psychiatric component like dementia to his change in mental status. [employer]/SW involved in tracing identity and obtaining further collateral.   - CT head unremarkable  - Tox screen negative  - Labs do not explain MS change  - Syphilis screen negative

## 2020-04-09 NOTE — PROGRESS NOTE ADULT - PROBLEM SELECTOR PLAN 6
VTE PPx with lovenox  Social work involved, pt is medically stable for discharge once placement determined (likely shelter)

## 2020-04-09 NOTE — PROGRESS NOTE ADULT - SUBJECTIVE AND OBJECTIVE BOX
Medicine Progress Note  Ramónbryson CeeDO  Pager # [phone]     Patient is a 69y old  Male who presents with a chief complaint of Fever, AMS (08 Apr 2020 17:53)    SUBJECTIVE / OVERNIGHT EVENTS: Pt unable to provide ROS, only states that he's hungry over and over.  Remains afebrile for past 24 hours, nontoxic appearing.     MEDICATIONS  (STANDING):  enoxaparin Injectable 40 milliGRAM(s) SubCutaneous daily    MEDICATIONS  (PRN):    CAPILLARY BLOOD GLUCOSE        I&O's Summary    08 Apr 2020 07:01  -  09 Apr 2020 07:00  --------------------------------------------------------  IN: 0 mL / OUT: 150 mL / NET: -150 mL        PHYSICAL EXAM:  Vital Signs Last 24 Hrs  T(C): 37.1 (09 Apr 2020 17:30), Max: 37.2 (09 Apr 2020 05:35)  T(F): 98.8 (09 Apr 2020 17:30), Max: 98.9 (09 Apr 2020 05:35)  HR: 70 (09 Apr 2020 17:30) (67 - 96)  BP: 127/88 (09 Apr 2020 17:30) (102/61 - 127/88)  BP(mean): --  RR: 18 (09 Apr 2020 17:30) (17 - 18)  SpO2: 97% (09 Apr 2020 17:30) (96% - 98%)    CONSTITUTIONAL: NAD, disheveled  RESPIRATORY: Normal respiratory effort; lungs are clear to auscultation bilaterally  CARDIOVASCULAR: No lower extremity edema  ABDOMEN: Nontender to palpation  PSYCH: Unable to assess  NEUROLOGY: CN 2-12 are intact and symmetric; no gross motor/sensory deficits   SKIN: Scattered excoriations on b/l LE     LABS:  No new labs     COVID-19 PCR: Detected (04 Apr 2020 02:30)    Electrocardiogram/QTc Interval: 464 ms
Patient is a 69y old  Male who presents with a chief complaint of Fever, AMS (2020 01:38)      SUBJECTIVE / OVERNIGHT EVENTS:  Unable to obtain additional hx given mental status      Vital Signs Last 24 Hrs  T(C): 36.9 (2020 19:11), Max: 38.1 (2020 21:47)  T(F): 98.4 (2020 19:11), Max: 100.5 (2020 21:47)  HR: 88 (2020 19:11) (88 - 92)  BP: 135/78 (2020 19:11) (117/59 - 148/80)  BP(mean): --  RR: 22 (2020 19:11) (16 - 22)  SpO2: 94% (2020 19:11) (94% - 100%)    PHYSICAL EXAM:  See H+P from this am    LABS:                        11.2   10.17 )-----------( 266      ( 2020 22:20 )             34.0     04-03    138  |  99  |  65<H>  ----------------------------<  111<H>  4.4   |  20<L>  |  1.50<H>    Ca    9.7      2020 22:20  Phos  3.9     04-03  Mg     2.9     04-03    TPro  8.3  /  Alb  4.0  /  TBili  0.7  /  DBili  x   /  AST  170<H>  /  ALT  95<H>  /  AlkPhos  82  04-03    PT/INR - ( 2020 22:20 )   PT: 12.6 SEC;   INR: 1.09          PTT - ( 2020 22:20 )  PTT:26.3 SEC      Urinalysis Basic - ( 2020 00:15 )    Color: LIGHT YELLOW / Appearance: CLEAR / S.024 / pH: 5.5  Gluc: NEGATIVE / Ketone: SMALL  / Bili: NEGATIVE / Urobili: NORMAL   Blood: MODERATE / Protein: 30 / Nitrite: NEGATIVE   Leuk Esterase: NEGATIVE / RBC: 6-10 / WBC 0-2   Sq Epi: OCC / Non Sq Epi: x / Bacteria: NEGATIVE
Patient is a 69y old  Male who presents with a chief complaint of Fever, AMS (2020 19:31)      SUBJECTIVE / OVERNIGHT EVENTS:  Patient seen and examined  No acute events overnight.    MEDICATIONS  (STANDING):  ascorbic acid 500 milliGRAM(s) Oral two times a day  enoxaparin Injectable 40 milliGRAM(s) SubCutaneous daily  hydroxychloroquine   Oral   hydroxychloroquine 400 milliGRAM(s) Oral every 12 hours  lactated ringers. 450 milliLiter(s) (75 mL/Hr) IV Continuous <Continuous>  thiamine 100 milliGRAM(s) Oral daily        Vital Signs Last 24 Hrs  T(C): 36.7 (2020 10:55), Max: 36.9 (2020 19:11)  T(F): 98 (2020 10:55), Max: 98.4 (2020 19:11)  HR: 89 (2020 10:55) (88 - 89)  BP: 130/88 (2020 10:55) (124/88 - 135/78)  BP(mean): --  RR: 20 (2020 10:55) (20 - 22)  SpO2: 95% (2020 10:55) (93% - 95%)  CAPILLARY BLOOD GLUCOSE        I&O's Summary    2020 07:01  -  2020 07:00  --------------------------------------------------------  IN: 0 mL / OUT: 1000 mL / NET: -1000 mL    2020 07:01  -  2020 17:21  --------------------------------------------------------  IN: 300 mL / OUT: 1400 mL / NET: -1100 mL        PHYSICAL EXAM:  GENERAL: Unkempt  HEENT:  Atraumatic, Normocephalic, dry mm, conjunctiva and sclera clear  NECK: Supple, No JVD  CHEST/LUNG: Clear to auscultation bilaterally; No wheeze  HEART: Regular rate and rhythm; No murmurs, rubs, or gallops  ABDOMEN: Soft, Nontender, Nondistended; Bowel sounds present  EXTREMITIES:  2+ Peripheral Pulses, No clubbing, cyanosis, or edema  PSYCH: Awake, appears alert. follows commands. Repeats same incoherent word with every question.  NEUROLOGY: Moves all ext  SKIN: multiple excoriations    LABS:                        10.2   7.29  )-----------( 242      ( 2020 06:15 )             30.3     04-05    148<H>  |  110<H>  |  29<H>  ----------------------------<  105<H>  4.3   |  20<L>  |  1.03    Ca    9.4      2020 06:15  Phos  2.9     04-05  Mg     2.4     04-05    TPro  7.4  /  Alb  3.4  /  TBili  0.6  /  DBili  x   /  AST  98<H>  /  ALT  80<H>  /  AlkPhos  73  04-05    PT/INR - ( 2020 22:20 )   PT: 12.6 SEC;   INR: 1.09          PTT - ( 2020 22:20 )  PTT:26.3 SEC      Urinalysis Basic - ( 2020 00:15 )    Color: LIGHT YELLOW / Appearance: CLEAR / S.024 / pH: 5.5  Gluc: NEGATIVE / Ketone: SMALL  / Bili: NEGATIVE / Urobili: NORMAL   Blood: MODERATE / Protein: 30 / Nitrite: NEGATIVE   Leuk Esterase: NEGATIVE / RBC: 6-10 / WBC 0-2   Sq Epi: OCC / Non Sq Epi: x / Bacteria: NEGATIVE        RADIOLOGY & ADDITIONAL TESTS:    Imaging Personally Reviewed:    Consultant(s) Notes Reviewed:      Care Discussed with Consultants/Other Providers:
[facility] Division of Hospital Medicine  Tania Cee DO  Pager (JONEL, 8A-5P): [phone]    Patient is a 69y old  Male who presents with a chief complaint of Fever, AMS (05 Apr 2020 17:21)    SUBJECTIVE / OVERNIGHT EVENTS: Unable to obtain ROS as pt minimally conversant.  Only says, "I'm hungry."     MEDICATIONS  (STANDING):  ascorbic acid 500 milliGRAM(s) Oral two times a day  enoxaparin Injectable 40 milliGRAM(s) SubCutaneous daily  hydroxychloroquine   Oral   hydroxychloroquine 200 milliGRAM(s) Oral every 12 hours  lactated ringers. 450 milliLiter(s) (75 mL/Hr) IV Continuous <Continuous>  thiamine 100 milliGRAM(s) Oral daily    I&O's Summary    05 Apr 2020 07:01  -  06 Apr 2020 07:00  --------------------------------------------------------  IN: 600 mL / OUT: 1900 mL / NET: -1300 mL      PHYSICAL EXAM:  Vital Signs Last 24 Hrs  T(C): 36.9 (06 Apr 2020 10:07), Max: 36.9 (05 Apr 2020 18:52)  T(F): 98.4 (06 Apr 2020 10:07), Max: 98.4 (05 Apr 2020 18:52)  HR: 72 (06 Apr 2020 10:07) (72 - 98)  BP: 124/72 (06 Apr 2020 10:07) (124/72 - 132/84)  BP(mean): --  RR: 20 (06 Apr 2020 10:07) (19 - 20)  SpO2: 96% (06 Apr 2020 10:07) (95% - 97%)    CONSTITUTIONAL: Disheveled, nontoxic appearing   RESPIRATORY: Normal respiratory effort; breathing comfortably on RA   CARDIOVASCULAR: No lower extremity edema  MUSCLOSKELETAL:  No joint swelling or tenderness to palpation  PSYCH: Awake and alert, unable to assess as pt only says 2 words despite multiple questions being asked   NEUROLOGY: CN 2-12 are intact and symmetric; no gross motor/sensory deficits;   SKIN: Scattered excoriations on b/l LE, nonpurulent     LABS:                        9.8    5.76  )-----------( 207      ( 06 Apr 2020 06:50 )             30.4     04-06    141  |  106  |  22  ----------------------------<  96  3.9   |  22  |  0.87    Ca    9.0      06 Apr 2020 06:50  Phos  3.3     04-06  Mg     2.0     04-06    TPro  6.9  /  Alb  3.1<L>  /  TBili  0.6  /  DBili  x   /  AST  70<H>  /  ALT  66<H>  /  AlkPhos  64  04-06    Culture - Blood (collected 04 Apr 2020 01:59)  Source: .Blood Blood-Venous  Preliminary Report (05 Apr 2020 02:01):    No growth to date.    Culture - Blood (collected 04 Apr 2020 01:59)  Source: .Blood Blood-Peripheral  Preliminary Report (05 Apr 2020 02:01):    No growth to date.    COVID-19 PCR . (04.04.20 @ 02:30)    COVID-19 PCR: Detected: This test has been validated by [employer] to be accurate;  though it has not been FDA cleared/approved by the usual pathway.  As with all laboratory tests, results should be correlated with clinical  findings.  https://www.fda.gov/media/305252/download  https://www.fda.gov/media/097038/download    Blood Gas Venous Comprehensive (04.06.20 @ 06:50)    Blood Gas Venous - Lactate: 1.3: Please note updated reference range. mmol/L    Blood Gas Venous - Chloride: 111 mmol/L    Blood Gas Venous - Creatinine: 0.87 mg/dL    pH, Venous: 7.43 pH    pCO2, Venous: 37 mmHg    pO2, Venous: 49 mmHg    HCO3, Venous: 24 mmol/L    Base Excess, Venous: 0.1: REFERENCE RANGE = -3 + 2 mmol/L mmol/L    Oxygen Saturation, Venous: 80.5 %    Blood Gas Venous - Sodium: 140 mmol/L    Blood Gas Venous - Potassium: 3.9 mmol/L    Blood Gas Venous - Glucose: 93 mg/dL    Blood Gas Venous - Hemoglobin: 9.7 g/dL    Blood Gas Venous - Hematocrit: 30.1 %    Ferritin, Serum (04.06.20 @ 06:50)    Ferritin, Serum: 398.3 ng/mL        RADIOLOGY & ADDITIONAL TESTS:  Results Reviewed:   < from: Xray Chest 1 View AP/PA (04.03.20 @ 21:31) >  IMPRESSION:     Bibasilar patchy airspace opacities which may be infectious in etiology.     < from: CT Head No Cont (04.03.20 @ 21:05) >  IMPRESSION:     No acute intracranial hemorrhage or mass effect, given the extent of artifact. If clinically indicated, short-term follow-up or MRI may be obtained for further evaluation.       < end of copied text >
[facility] Division of Hospital Medicine  Sonali Quiles MD  Pager [phone]    Patient is a 69y old  Male who presents with a chief complaint of Fever, AMS (08 Apr 2020 17:53)      SUBJECTIVE / OVERNIGHT EVENTS: pt seen at [facility]- unclear if he speaks English- has echolalia ?dementia  ADDITIONAL REVIEW OF SYSTEMS:    MEDICATIONS  (STANDING):  enoxaparin Injectable 40 milliGRAM(s) SubCutaneous daily    MEDICATIONS  (PRN):      CAPILLARY BLOOD GLUCOSE        I&O's Summary    07 Apr 2020 07:01  -  08 Apr 2020 07:00  --------------------------------------------------------  IN: 0 mL / OUT: 450 mL / NET: -450 mL        PHYSICAL EXAM:  Vital Signs Last 24 Hrs  T(C): 37 (08 Apr 2020 21:55), Max: 37 (08 Apr 2020 21:55)  T(F): 98.6 (08 Apr 2020 21:55), Max: 98.6 (08 Apr 2020 21:55)  HR: 67 (08 Apr 2020 21:55) (67 - 77)  BP: 111/73 (08 Apr 2020 21:55) (104/63 - 124/79)  BP(mean): --  RR: 17 (08 Apr 2020 21:55) (17 - 18)  SpO2: 97% (08 Apr 2020 21:55) (93% - 97%)  CONSTITUTIONAL: NAD  RESPIRATORY: Normal respiratory effort;   CARDIOVASCULAR: No lower extremity edema;   SKIN: excoriations on LE's and UE's      LABS:                        9.9    4.52  )-----------( 230      ( 07 Apr 2020 08:00 )             30.4     04-07    137  |  103  |  19  ----------------------------<  96  4.3   |  25  |  0.87    Ca    8.5      07 Apr 2020 08:00  Phos  3.8     04-07  Mg     2.0     04-07    TPro  6.2  /  Alb  3.0<L>  /  TBili  0.4  /  DBili  x   /  AST  50<H>  /  ALT  60<H>  /  AlkPhos  63  04-07                RADIOLOGY & ADDITIONAL TESTS:  Results Reviewed:   Imaging Personally Reviewed:  Electrocardiogram Personally Reviewed:    COORDINATION OF CARE:  Care Discussed with Consultants/Other Providers [Y/N]:  Prior or Outpatient Records Reviewed [Y/N]:
[facility] Division of Hospital Medicine  Sonali Quiles MD  Pager [phone]    Patient is a 69y old  Male who presents with a chief complaint of Fever, AMS (07 Apr 2020 17:11)      SUBJECTIVE / OVERNIGHT EVENTS: pt seen and examined at [facility]- mumbling- not sure if he speaks English echolalia- repeating "okay"  ADDITIONAL REVIEW OF SYSTEMS:    MEDICATIONS  (STANDING):  enoxaparin Injectable 40 milliGRAM(s) SubCutaneous daily    MEDICATIONS  (PRN):      CAPILLARY BLOOD GLUCOSE        I&O's Summary      PHYSICAL EXAM:  Vital Signs Last 24 Hrs  T(C): 36.9 (07 Apr 2020 10:52), Max: 36.9 (07 Apr 2020 10:52)  T(F): 98.4 (07 Apr 2020 10:52), Max: 98.4 (07 Apr 2020 10:52)  HR: 82 (07 Apr 2020 10:52) (82 - 82)  BP: 113/73 (07 Apr 2020 10:52) (113/73 - 113/73)  BP(mean): --  RR: 18 (07 Apr 2020 10:52) (18 - 18)  SpO2: 94% (07 Apr 2020 10:52) (94% - 94%)  CONSTITUTIONAL: NAD,   RESPIRATORY: Normal respiratory effort; lungs are clear to auscultation bilaterally  CARDIOVASCULAR: Regular rate and rhythm, normal S1 and S2,  No lower extremity edema;   ABDOMEN: Nontender to palpation, normoactive bowel sounds, not distended;         LABS:                        9.9    4.52  )-----------( 230      ( 07 Apr 2020 08:00 )             30.4     04-07    137  |  103  |  19  ----------------------------<  96  4.3   |  25  |  0.87    Ca    8.5      07 Apr 2020 08:00  Phos  3.8     04-07  Mg     2.0     04-07    TPro  6.2  /  Alb  3.0<L>  /  TBili  0.4  /  DBili  x   /  AST  50<H>  /  ALT  60<H>  /  AlkPhos  63  04-07                RADIOLOGY & ADDITIONAL TESTS:  Results Reviewed:   Imaging Personally Reviewed:  Electrocardiogram Personally Reviewed:    COORDINATION OF CARE:  Care Discussed with Consultants/Other Providers [Y/N]:  Prior or Outpatient Records Reviewed [Y/N]:

## 2020-04-09 NOTE — PROGRESS NOTE ADULT - PROBLEM SELECTOR PLAN 3
- With hypernatremia, which has now resolved  - Would dc IVF and encourage PO intake
On maintenance fluids  monitor Cr
s/p fluids while in ED  Repeat BMP in am

## 2020-04-09 NOTE — PROGRESS NOTE ADULT - ASSESSMENT
69M found in lobby of [facility]. On admission, febrile with acute encephalopathy, found to be COVID positive with no other obvious infectious causes.   [employer] involved and after communicating with pt in Polish, he was able to state his name "Norbert Norton"  [birth year] and per CM note, pt was referred to homeless shelter in past.

## 2020-04-09 NOTE — PROGRESS NOTE ADULT - PROBLEM SELECTOR PROBLEM 2
2019 novel coronavirus disease (COVID-19)
Suspected 2019 novel coronavirus infection

## 2020-04-09 NOTE — PROGRESS NOTE ADULT - PROBLEM SELECTOR PROBLEM 4
Thyroid dysfunction
Thyroid dysfunction
Dehydration
Thyroid dysfunction
Thyroid dysfunction
Transaminitis

## 2020-04-10 VITALS
OXYGEN SATURATION: 99 % | HEART RATE: 85 BPM | SYSTOLIC BLOOD PRESSURE: 115 MMHG | RESPIRATION RATE: 18 BRPM | DIASTOLIC BLOOD PRESSURE: 70 MMHG | TEMPERATURE: 98 F

## 2020-04-10 PROCEDURE — 99239 HOSP IP/OBS DSCHRG MGMT >30: CPT

## 2020-04-10 NOTE — DISCHARGE NOTE PROVIDER - HOSPITAL COURSE
69M found in lobby of [facility]. On admission, febrile with acute encephalopathy, found to be COVID positive with no other obvious infectious causes. [employer] involved and after communicating with pt in Polish, he was able to state his name "Norbert oNrton"  [birth year] and per  note, pt was referred to [facility] shelter in past.         1. Encephalopathy.      - Possibly secondary to COVID-19 as there is no other clear source.      - High suspicion that there is an underlying psychiatric component like dementia to his change in mental status.     - [employer]/[place] involved in tracing identity and obtaining further collateral.     - CT head unremarkable    - Tox screen negative    - Labs do not explain MS change    - Syphilis screen negative.     - Per SW - pt accepted to [facility]            2. 2019 novel coronavirus disease (COVID-19).      - Would dc plaquenil at this time, pt not hypoxic and QTc on repeat EKG 480ms    - Pt so far is saturating well on RA, afebrile for past 24 hours    - Transaminitis likely reflective of acute COVID infection    - Would hold off on trending inflammatory markers as pt not hypoxic.         3. Dehydration.      - With hypernatremia, which has now resolved    - Would dc IVF and encourage PO intake.         4. Thyroid dysfunction.      - TSH suppressed, free T4 normal    - monitor for now.         5. Transaminitis.      - Likely 2/2 COVID.         6. Need for prophylactic measure.     - VTE PPx with lovenox        Patient is medically stable for discharge to shelter

## 2020-04-10 NOTE — CHART NOTE - NSCHARTNOTEFT_GEN_A_CORE
Discharge Note:  Pt seen and examined, Polish  # [id number] used.  Pt remains very limited with his history, states that he's been homeless and on the streets for the past 10 years with no family contact.   He is in agreement with being placed in a shelter.   He remains medically stable for dc home, saturating well on RA.    DC time: 32 minutes

## 2020-04-10 NOTE — DISCHARGE NOTE NURSING/CASE MANAGEMENT/SOCIAL WORK - PATIENT PORTAL LINK FT
You can access the FollowMyHealth Patient Portal offered by [facility] by registering at the following website: [email or web address]. By joining [employer]’s FollowMyHealth portal, you will also be able to view your health information using other applications (apps) compatible with our system.

## 2020-04-10 NOTE — DISCHARGE NOTE PROVIDER - NSDCCPCAREPLAN_GEN_ALL_CORE_FT
PRINCIPAL DISCHARGE DIAGNOSIS  Diagnosis: 2019 novel coronavirus disease (COVID-19)  Assessment and Plan of Treatment: You have been diagnosed with the COVID-19 virus during your hospital stay. Separate yourself from other household members and stay on home isolation until 3 things have happened:  1. At least 7 days have passed since symptoms first appeared or first positive test.  2. You have been fever-free for 72 hours (3 days) without use of medication (such as Tylenol)  3. Other symptomst have improved, such as cough or shortness of breath.   It has been determined that you no longer need hospitalization and can recover while remaining in self-quarantine at home. You should follow the prevention steps below until a healthcare provider or local or state health department says you can return to your normal activities.  1. You should restrict activities outside your home, except for getting medical care.  2. Do not go to work, school, or public areas.  3. Avoid using public transportation, ride-sharing, or taxis.  4. Separate yourself from other people and animals in your home.  5. Call ahead before visiting your doctor.  6. Wear a facemask.  7. Cover your coughs and sneezes.  8. Clean your hands often.  9. Avoid sharing personal household items.  10. Clean all “high-touch” surfaces everyday.  11. Monitor your symptoms.  If you have a medical emergency and need to call 911, notify the dispatch personnel that you have COVID-19 If possible, put on a facemask before emergency medical services arrive.  12. Stopping home isolation.  Patients with confirmed COVID-19 should remain under home isolation precautions for 7 days since the positive COVID-19 test and until the risk of secondary transmission to others is thought to be low. The decision to discontinue home isolation precautions should be made on a case-by-case basis, in consultation with healthcare providers and state and local health departments. Your [facility] Department of Health can be reached at [phone] for further information about COVID-19.      SECONDARY DISCHARGE DIAGNOSES  Diagnosis: Encephalopathy  Assessment and Plan of Treatment: Possibly due to COVID-19 as there is no other clear source. Possibly start of dementia. Labs do not explain mental status change. You have been accepted to [facility].